# Patient Record
Sex: MALE | URBAN - METROPOLITAN AREA
[De-identification: names, ages, dates, MRNs, and addresses within clinical notes are randomized per-mention and may not be internally consistent; named-entity substitution may affect disease eponyms.]

---

## 2023-03-07 ENCOUNTER — OFFICE VISIT (OUTPATIENT)
Dept: URBAN - METROPOLITAN AREA CLINIC 43 | Facility: CLINIC | Age: 38
End: 2023-03-07
Payer: COMMERCIAL

## 2023-03-07 DIAGNOSIS — H18.623 KERATOCONUS, UNSTABLE, BILATERAL: Primary | ICD-10-CM

## 2023-03-07 DIAGNOSIS — H40.013 OPEN ANGLE WITH BORDERLINE FINDINGS, LOW RISK, BILATERAL: ICD-10-CM

## 2023-03-07 PROCEDURE — 92025 CPTRIZED CORNEAL TOPOGRAPHY: CPT | Performed by: OPTOMETRIST

## 2023-03-07 PROCEDURE — 99204 OFFICE O/P NEW MOD 45 MIN: CPT | Performed by: OPTOMETRIST

## 2023-03-07 ASSESSMENT — INTRAOCULAR PRESSURE
OS: 17
OD: 18

## 2023-03-07 ASSESSMENT — KERATOMETRY
OD: 43.63
OS: 49.75

## 2023-03-07 ASSESSMENT — VISUAL ACUITY
OS: 20/60
OD: 20/50

## 2023-03-07 NOTE — IMPRESSION/PLAN
Impression: Keratoconus, unstable, bilateral: P94.310.
h/o RGP wear Plan:  Impacting vision, inferior steepening of corneas OS>OD, with pellucid like pattern,  Refer to Dr. Ira Car next available for CXL consult, then will recommend RGP/scleral lens fitting, gave MRx for now due to patient's glasses being broken

## 2023-03-07 NOTE — IMPRESSION/PLAN
Impression: Open angle with borderline findings, low risk, bilateral: H40.013.
denies family history of glaucoma Plan: 2/2 c/d asymmetry OS>OD
return in 6 months for IOP/VF/RNFL/PACHS

## 2023-05-31 ENCOUNTER — OFFICE VISIT (OUTPATIENT)
Dept: URBAN - METROPOLITAN AREA CLINIC 10 | Facility: CLINIC | Age: 38
End: 2023-05-31
Payer: COMMERCIAL

## 2023-05-31 DIAGNOSIS — H40.013 OPEN ANGLE WITH BORDERLINE FINDINGS, LOW RISK, BILATERAL: ICD-10-CM

## 2023-05-31 DIAGNOSIS — H18.623 KERATOCONUS, UNSTABLE, BILATERAL: Primary | ICD-10-CM

## 2023-05-31 PROCEDURE — 92025 CPTRIZED CORNEAL TOPOGRAPHY: CPT | Performed by: OPHTHALMOLOGY

## 2023-05-31 PROCEDURE — 99204 OFFICE O/P NEW MOD 45 MIN: CPT | Performed by: OPHTHALMOLOGY

## 2023-05-31 ASSESSMENT — VISUAL ACUITY
OD: 20/50
OS: 20/60

## 2023-05-31 ASSESSMENT — INTRAOCULAR PRESSURE
OS: 14
OD: 16

## 2023-05-31 NOTE — IMPRESSION/PLAN
Impression: Keratoconus, unstable, bilateral: O57.442. Plan: Explained to the patient the progressive nature of the disease and can significantly reduce a patient's visual acuity and visual quality. First line treatments include hard contact lenses, scleral contact lenses and intracorneal ring segments. These treatments address signs and symptoms but do not halt progression of the disease. If the disease progresses, it can lead to corneal blindness and may require corneal transplantation for visual recovery which is invasive, has a prolonged recovery period, and carries a lifelong risk of tissue rejection, graft failure, post-surgical medications and other complications. Corneal crosslinking (CXL) is the only FDA-approved treatment modality that has been shown to slow or halt the progression of keratoconus. This may prevent the need for future corneal transplantation. Early treatment of keratoconus with CXL is medically necessary and indicated. The pt understands and would like to proceed with surgery. EVIDENCE OF PROGRESSION:
Astigmatism on refraction has increased >1D: OS has increased from 2.00D to 5.25D Myopic shift (decrease in spherical equivalent ) on refraction OU of 0.50D: -4.625D to  -9.25OD; -2.875D to -13D OS Pt has failed conservative treatment: Pt has tried glasses and CTLs, and has continued to experience progressive loss in BCVA and increase in MRx. 

Schedule CXL OU, OS first

## 2023-10-30 ENCOUNTER — OFFICE VISIT (OUTPATIENT)
Dept: URBAN - METROPOLITAN AREA CLINIC 43 | Facility: CLINIC | Age: 38
End: 2023-10-30
Payer: COMMERCIAL

## 2023-10-30 DIAGNOSIS — H40.013 OPEN ANGLE WITH BORDERLINE FINDINGS, LOW RISK, BILATERAL: Primary | ICD-10-CM

## 2023-10-30 PROCEDURE — 99213 OFFICE O/P EST LOW 20 MIN: CPT | Performed by: OPTOMETRIST

## 2023-10-30 PROCEDURE — 76514 ECHO EXAM OF EYE THICKNESS: CPT | Performed by: OPTOMETRIST

## 2023-10-30 PROCEDURE — 92133 CPTRZD OPH DX IMG PST SGM ON: CPT | Performed by: OPTOMETRIST

## 2023-10-30 PROCEDURE — 92083 EXTENDED VISUAL FIELD XM: CPT | Performed by: OPTOMETRIST

## 2023-10-30 ASSESSMENT — INTRAOCULAR PRESSURE
OS: 16
OD: 16

## 2023-11-15 ENCOUNTER — OFFICE VISIT (OUTPATIENT)
Dept: URBAN - METROPOLITAN AREA CLINIC 10 | Facility: CLINIC | Age: 38
End: 2023-11-15
Payer: COMMERCIAL

## 2023-11-15 PROCEDURE — 0402T COLGN CRS-LINK CRN&PACHYMTRY: CPT | Performed by: OPHTHALMOLOGY

## 2023-11-21 ENCOUNTER — OFFICE VISIT (OUTPATIENT)
Dept: URBAN - METROPOLITAN AREA CLINIC 43 | Facility: CLINIC | Age: 38
End: 2023-11-21
Payer: COMMERCIAL

## 2023-11-21 PROCEDURE — 99213 OFFICE O/P EST LOW 20 MIN: CPT | Performed by: OPTOMETRIST

## 2023-12-20 ENCOUNTER — OFFICE VISIT (OUTPATIENT)
Dept: URBAN - METROPOLITAN AREA CLINIC 44 | Facility: CLINIC | Age: 38
End: 2023-12-20
Payer: COMMERCIAL

## 2023-12-20 DIAGNOSIS — H18.623 KERATOCONUS, UNSTABLE, BILATERAL: Primary | ICD-10-CM

## 2023-12-20 PROCEDURE — 99213 OFFICE O/P EST LOW 20 MIN: CPT | Performed by: OPHTHALMOLOGY

## 2023-12-20 ASSESSMENT — INTRAOCULAR PRESSURE
OS: 19
OD: 15